# Patient Record
Sex: MALE
[De-identification: names, ages, dates, MRNs, and addresses within clinical notes are randomized per-mention and may not be internally consistent; named-entity substitution may affect disease eponyms.]

---

## 2021-01-01 ENCOUNTER — HOME CARE VISIT (OUTPATIENT)
Dept: SCHEDULING | Facility: HOME HEALTH | Age: 71
End: 2021-01-01
Payer: COMMERCIAL

## 2021-01-01 ENCOUNTER — HOSPICE ADMISSION (OUTPATIENT)
Dept: HOSPICE | Facility: HOSPICE | Age: 71
End: 2021-01-01
Payer: COMMERCIAL

## 2021-01-01 ENCOUNTER — HOME CARE VISIT (OUTPATIENT)
Dept: HOSPICE | Facility: HOSPICE | Age: 71
End: 2021-01-01
Payer: COMMERCIAL

## 2021-01-01 VITALS
DIASTOLIC BLOOD PRESSURE: 80 MMHG | HEART RATE: 88 BPM | TEMPERATURE: 97.8 F | SYSTOLIC BLOOD PRESSURE: 120 MMHG | RESPIRATION RATE: 18 BRPM

## 2021-01-01 VITALS
SYSTOLIC BLOOD PRESSURE: 120 MMHG | DIASTOLIC BLOOD PRESSURE: 80 MMHG | RESPIRATION RATE: 20 BRPM | TEMPERATURE: 98.2 F | HEART RATE: 84 BPM

## 2021-01-01 VITALS
TEMPERATURE: 98 F | DIASTOLIC BLOOD PRESSURE: 60 MMHG | RESPIRATION RATE: 12 BRPM | HEART RATE: 92 BPM | SYSTOLIC BLOOD PRESSURE: 90 MMHG

## 2021-01-01 VITALS
HEART RATE: 88 BPM | RESPIRATION RATE: 16 BRPM | SYSTOLIC BLOOD PRESSURE: 115 MMHG | DIASTOLIC BLOOD PRESSURE: 65 MMHG | TEMPERATURE: 99.7 F

## 2021-01-01 VITALS
RESPIRATION RATE: 14 BRPM | HEART RATE: 82 BPM | SYSTOLIC BLOOD PRESSURE: 108 MMHG | DIASTOLIC BLOOD PRESSURE: 60 MMHG | TEMPERATURE: 99 F

## 2021-01-01 VITALS
RESPIRATION RATE: 20 BRPM | TEMPERATURE: 98.6 F | HEART RATE: 80 BPM | SYSTOLIC BLOOD PRESSURE: 106 MMHG | DIASTOLIC BLOOD PRESSURE: 68 MMHG

## 2021-01-01 VITALS
SYSTOLIC BLOOD PRESSURE: 90 MMHG | HEART RATE: 84 BPM | DIASTOLIC BLOOD PRESSURE: 60 MMHG | RESPIRATION RATE: 20 BRPM | TEMPERATURE: 98 F

## 2021-01-01 PROCEDURE — 0651 HSPC ROUTINE HOME CARE

## 2021-01-01 PROCEDURE — G0156 HHCP-SVS OF AIDE,EA 15 MIN: HCPCS

## 2021-01-01 PROCEDURE — 3336500001 HSPC ELECTION

## 2021-01-01 PROCEDURE — G0299 HHS/HOSPICE OF RN EA 15 MIN: HCPCS

## 2021-01-01 PROCEDURE — G0155 HHCP-SVS OF CSW,EA 15 MIN: HCPCS

## 2021-01-01 PROCEDURE — HOSPICE MEDICATION HC HH HOSPICE MEDICATION

## 2021-10-06 PROBLEM — C25.0 MALIGNANT NEOPLASM OF HEAD OF PANCREAS (HCC): Status: ACTIVE | Noted: 2021-01-01

## 2021-10-06 PROBLEM — D50.9 IRON DEFICIENCY ANEMIA: Status: ACTIVE | Noted: 2021-01-01

## 2021-10-06 PROBLEM — C25.9 PANCREATIC ADENOCARCINOMA (HCC): Status: ACTIVE | Noted: 2021-01-01

## 2021-10-06 PROBLEM — R79.1 ELEVATED INR: Status: ACTIVE | Noted: 2021-01-01

## 2021-10-06 PROBLEM — J84.10 LUNG FIBROSIS (HCC): Status: ACTIVE | Noted: 2021-01-01

## 2021-10-06 PROBLEM — R93.89 ABNORMAL FINDING ON IMAGING: Status: ACTIVE | Noted: 2021-01-01

## 2021-10-06 PROBLEM — K86.89 PANCREATIC MASS: Status: ACTIVE | Noted: 2021-01-01

## 2021-10-06 PROBLEM — R74.01 TRANSAMINITIS: Status: ACTIVE | Noted: 2021-01-01

## 2021-10-06 PROBLEM — N17.9 AKI (ACUTE KIDNEY INJURY) (HCC): Status: ACTIVE | Noted: 2021-01-01

## 2021-10-06 PROBLEM — K92.1 MELENA: Status: ACTIVE | Noted: 2021-01-01

## 2021-10-06 PROBLEM — E80.6 HYPERBILIRUBINEMIA: Status: ACTIVE | Noted: 2021-01-01

## 2021-10-06 NOTE — HOSPICE
Corinne Seth, 66year old male admitted to Hospice in home services on 10-5-21 for a diagnosis of Pancreatic Cancer. Right arm Mac 22 cm. PPS 40%. Pt was living alone and going to work everyday while undergoing chemo for pancreatic cancer. He was at [facility] getting supplies to fix his water in his home, when he passed out from a gi bleed. pts hgb in hospital was 5.2. Last Hgb before hospital dc was 9.2. Family was unaware that pt had any illness. pt has a dtr [place] that he does not communicate with. Pt has now moved into his elderly sister in law, Shikha's apartment. His niece, [place], is arranging for 24 hr family cg's. Pt is very pleasant but lethargic. Pt is newly bedbound. Pt had a triana inserted on 10-5-21. Pt needs assistance with all ADLs. [facility] HHA ordered 2 x weekly for bathing and teaching family how to care for a bedbound pt. Pt is a DNR. Pt has a Right port that was flushed 10-5-21. Pt has a history of smoking and pulmonary fibrosis. Pt did not require o2 until this past hospitalization. pt is now dependent on o2 at 5 liters continuous. Pt has decreased appetite and only eating bites daily. Pt has lost several pounds in the past 6 months per family. pt is 6'1\" and 144#. A  concentrator, hospital bed, BSC and table were ordered today. Chux pads, sacral cream and lg briefs were ordered from [facility] today. Roxanol & Ativan were ordered today from [facility] in [place]. RN educated Babs Nicki Mcburney and niyue, [place], [place] on hospice process and philosophy, medication management, pain control, skin care and protection, fall precautions, anxiety strategies, home safety, and social distancing due to COVID-19. Pt history, assessment, meds and status reviewed with patient's hospice attending MD, Dr. Jeremy Lujan. Mr. Ladarius Villagomez and sister, Mary [place], [place] of volunteer services but pending at this time due to [phone]; plans to reassess volunteer needs once volunteer services become available.  Patient is appropriate for hospice services at this time. SW and SC services accepted. Caregiver made aware that an RN will be completing a follow-up visit within 24 hours. Handwritten medication list, [facility] book and magnet with [facility] phone number left in home. RN educated Caregiver to call [facility] 24/7 phone line with any changes, concerns, or falls with verbalized understanding.

## 2021-10-06 NOTE — HOSPICE
Pt is 66 y.o.male with Pancreatic Cancer. Prior to hospital visit ,pt was living alone in his home and working daily. At present pt requires moderate assistance. Hospice aide is being requested to assist with personal care. Pt has moved into his sister in [address] [place] [address] home to have 24 hour caregivers. Pt's pato is very involved with his care and seeking HCPOA. [facility] [place] of [place] as requested. Spoke of end of life care and plans are to remain home with the assistance of family. Pt signed his DNR in presence of family. Encouraged all to vent feelings and offered emotional support. Advised of community resources and make appropriate referrals as needed. Reviewed emergency careplan and encouraged to contact hospice as needed. All voiced understanding.

## 2021-10-07 NOTE — HOSPICE
On arrival pt is lying in bed with head of bed elevated. Pt is sleepy but aroused for assessment. Family states pt is sleeping more today. Pt ate 1 meal and 1 snack yesterday. Today had grits in the morning. Pt was sleeping with jello in his hands. Pt has pain in his back, 4/10. Niyue showed RN times of admin of Roxanol. RN encouraged jorge [place] to give Roxanol every 4 hours until arrival of Fentanyl patch arrives. [place] agrees to do so. Instructions and education for patch placement and frequency. This RN to return tomorrow and reinforce education.

## 2021-10-08 NOTE — HOSPICE
Pt with increased lethargy. Pt has started to have jerky extremities. Increased sleep. Family is trying to grasp the dying process. Felicitas asked about physicial therapy for pt, RN explained pt would not benefit from this. Appetite has decreased further, encouraged family to give pt things when they are asked for, and not to force fluids or food as this may cause pt to vomit. Felicitas agreed with this plan, and states she took care of her boyfriend who was dying about 2 years ago. Gave Felicitas instructions for placing Fentany. patch when it arrives. Assured family of daily visits. VS remaining stable now. Pt is clammy, and throwing covers off. Anterior audible congestion noted. Increase in weakness noted since admission date 10/5/2021.

## 2021-10-09 NOTE — HOSPICE
Patient visit for transitioning-Rates generalized pain 1/10 and drowsy during visit but responds to verbal questions.  Denies any symptoms  Educated CGs on meds, dying process, safety-verbalized understanding  HHA to visit today  Cobb draining 300cc tea colored urine  Taking in sips and little else by mouth  VSS  Daily visits

## 2021-10-10 NOTE — HOSPICE
Patient states he is comfortable in his hospital bed and denies any symptoms at visit. CG Crystal using roxanol every 3-4 hours through the day and night with good effect. Eats few bites of apple sauce only and two 16 oz rahman a day. Cobb draining normally. Floated hips and heels. Verbalized understanding of meds, safety, aspiration precautions, and agreed to call hospice when urgent or changes. Foam pads ordered for prevention.

## 2021-10-11 NOTE — HOSPICE
Pt lyiing on hospital bed, lethargic, but arouses during assessment for a few seconds with appropriate responses. Family states pt is only taking in applesauce to swallow meds with, and maybe 2 sips water per day. Hands and feet very cold, pulses are decreased. HR 84, BP 90/60. Pt appears comfortable and states he is comfortable. Famly is giving Roxanol 5 mg q 3 hours as needed for pain. Discontinues Fentanyl patch that was not delivered yet per Dr Mandeep Sosa due to pt condition at end of life with cold hands and feet, lethargy, and pt comfortable. Called family and informed not to watch for Fentanyl. Reviewed meds, and family states they do not need any more supplies today. Further education on dying process with Galindo [place] and [place]. Reza draining tea colored urine. Urged family to call [facility] with any questons or concerns.

## 2021-10-12 NOTE — HOSPICE
Joint visit with Dr Obed Perry. Pt stares off toward ceiling. Minimal response about comfort level, but answering simple questions appropriately. Pt's hands and feet are very cold, pt appears comfortable, received order for Roxanol 10 mg q 2 hours prn for pain or dyspnea. Encouraged family to provide calm environment for pt. George [place] family friend took instructions, and repeat back instructions for increased dose of Roxanol.